# Patient Record
Sex: FEMALE | Employment: UNEMPLOYED | URBAN - METROPOLITAN AREA
[De-identification: names, ages, dates, MRNs, and addresses within clinical notes are randomized per-mention and may not be internally consistent; named-entity substitution may affect disease eponyms.]

---

## 2023-04-27 ENCOUNTER — OFFICE VISIT (OUTPATIENT)
Dept: PRIMARY CARE | Facility: CLINIC | Age: 58
End: 2023-04-27
Payer: COMMERCIAL

## 2023-04-27 VITALS
HEART RATE: 85 BPM | WEIGHT: 122 LBS | SYSTOLIC BLOOD PRESSURE: 91 MMHG | DIASTOLIC BLOOD PRESSURE: 60 MMHG | HEIGHT: 66 IN | BODY MASS INDEX: 19.61 KG/M2 | TEMPERATURE: 97.5 F | OXYGEN SATURATION: 95 %

## 2023-04-27 DIAGNOSIS — R06.09 DYSPNEA ON EXERTION: ICD-10-CM

## 2023-04-27 DIAGNOSIS — R07.9 CHEST PAIN, UNSPECIFIED TYPE: Primary | ICD-10-CM

## 2023-04-27 PROCEDURE — 1036F TOBACCO NON-USER: CPT | Performed by: INTERNAL MEDICINE

## 2023-04-27 PROCEDURE — 93000 ELECTROCARDIOGRAM COMPLETE: CPT | Performed by: INTERNAL MEDICINE

## 2023-04-27 PROCEDURE — 99203 OFFICE O/P NEW LOW 30 MIN: CPT | Performed by: INTERNAL MEDICINE

## 2023-04-27 RX ORDER — IBUPROFEN 200 MG
200 TABLET ORAL EVERY 6 HOURS
COMMUNITY

## 2023-04-27 RX ORDER — ASCORBIC ACID 500 MG
TABLET ORAL
COMMUNITY
Start: 2004-11-08 | End: 2023-04-27 | Stop reason: ALTCHOICE

## 2023-04-27 RX ORDER — IBUPROFEN 600 MG/1
600 TABLET ORAL EVERY 8 HOURS PRN
COMMUNITY
Start: 2019-05-08 | End: 2023-04-27 | Stop reason: ALTCHOICE

## 2023-04-27 RX ORDER — PHENAZOPYRIDINE HYDROCHLORIDE 200 MG/1
TABLET, FILM COATED ORAL
COMMUNITY
End: 2023-04-27 | Stop reason: ALTCHOICE

## 2023-04-27 NOTE — PROGRESS NOTES
"The patient is here for:   Chief Complaint   Patient presents with    Chest Pain    Shortness of Breath     Low hr  dizzy        I have reviewed existing histories, notes, past medical history, surgical history, social history, family history, med list, allergy list, and immunization, and updated if applicable.  PCP: Patty Gorman MD    HPI:     Pt came from [facility]. Has a pcp there.  She called their office a couple of weeks ago with Complains of sob, and chest pain. They told her to go to ER. She did not go. Has had discomfort left side chest, and some sob with mild exertion, since March. Noted heart rate lower than normal, resting heart rate below 50 at times. No ankle edema.  She finds it hard to sleep on left side, would notice discomfort from left side chest.  Doing yard work and walking, causes the chest discomfort.    She had a cold fever for 3 days in December. Tested negative for covid.     No major past medical or surgical history  Does not smoke, drink alcohol. Does not drink coffee.     Recent labs: hba1c 5.6, cr 0.8  normal LFT.  Mildly high cholesterol   No anemia  Gets pap done [facility]  Has order for mammogram this year.    Had an lesion on right arm frozen last week.  Pap slightly abn, and her gyn discussed with her per report of pap.    Additional concerns and ROS:   Some lightheadedness      Physical exam:  BP 91/60   Pulse 85   Temp 36.4 °C (97.5 °F)   Ht 1.664 m (5' 5.5\")   Wt 55.3 kg (122 lb)   SpO2 95%   BMI 19.99 kg/m²    No resp distress  Patient appears well, alert and oriented x 3, cooperative. No depression or anxiety.   Vitals are as documented.  Neck is supple and free of adenopathy, or masses. No thyromegaly.    Heart sounds are normal, no murmur, gallops or rubs.   Lungs are clear to auscultation    No chest wall tenderness   Abdomen is soft, no tenderness, masses or organomegaly.  Extremities are normal. Peripheral pulses are normal.   Neurologic exam is normal without focal " findings.   Skin is normal without suspicious lesions noted.   No acute joint swelling or pain.